# Patient Record
Sex: FEMALE | Race: BLACK OR AFRICAN AMERICAN | NOT HISPANIC OR LATINO | Employment: UNEMPLOYED | URBAN - METROPOLITAN AREA
[De-identification: names, ages, dates, MRNs, and addresses within clinical notes are randomized per-mention and may not be internally consistent; named-entity substitution may affect disease eponyms.]

---

## 2019-06-01 ENCOUNTER — OFFICE VISIT (OUTPATIENT)
Dept: URGENT CARE | Facility: CLINIC | Age: 54
End: 2019-06-01
Payer: MEDICAID

## 2019-06-01 VITALS
TEMPERATURE: 99 F | WEIGHT: 293 LBS | SYSTOLIC BLOOD PRESSURE: 134 MMHG | OXYGEN SATURATION: 98 % | DIASTOLIC BLOOD PRESSURE: 82 MMHG | HEART RATE: 107 BPM

## 2019-06-01 DIAGNOSIS — B96.89 ACUTE BRONCHITIS, BACTERIAL: Primary | ICD-10-CM

## 2019-06-01 DIAGNOSIS — J20.8 ACUTE BRONCHITIS, BACTERIAL: Primary | ICD-10-CM

## 2019-06-01 PROCEDURE — 99214 PR OFFICE/OUTPT VISIT, EST, LEVL IV, 30-39 MIN: ICD-10-PCS | Mod: 25,S$GLB,, | Performed by: NURSE PRACTITIONER

## 2019-06-01 PROCEDURE — 94640 AIRWAY INHALATION TREATMENT: CPT | Mod: S$GLB,,, | Performed by: NURSE PRACTITIONER

## 2019-06-01 PROCEDURE — 99214 OFFICE O/P EST MOD 30 MIN: CPT | Mod: 25,S$GLB,, | Performed by: NURSE PRACTITIONER

## 2019-06-01 PROCEDURE — 94640 PR INHAL RX, AIRWAY OBST/DX SPUTUM INDUCT: ICD-10-PCS | Mod: S$GLB,,, | Performed by: NURSE PRACTITIONER

## 2019-06-01 RX ORDER — PROMETHAZINE HYDROCHLORIDE AND CODEINE PHOSPHATE 6.25; 1 MG/5ML; MG/5ML
5 SOLUTION ORAL EVERY 4 HOURS PRN
Qty: 473 ML | Refills: 0 | Status: SHIPPED | OUTPATIENT
Start: 2019-06-01 | End: 2019-06-11

## 2019-06-01 RX ORDER — AMOXICILLIN AND CLAVULANATE POTASSIUM 875; 125 MG/1; MG/1
1 TABLET, FILM COATED ORAL 2 TIMES DAILY
Qty: 20 TABLET | Refills: 0 | Status: SHIPPED | OUTPATIENT
Start: 2019-06-01 | End: 2019-06-11

## 2019-06-01 RX ORDER — METHYLPREDNISOLONE 4 MG/1
TABLET ORAL
Qty: 1 PACKAGE | Refills: 0 | Status: SHIPPED | OUTPATIENT
Start: 2019-06-01

## 2019-06-01 RX ORDER — BETAMETHASONE SODIUM PHOSPHATE AND BETAMETHASONE ACETATE 3; 3 MG/ML; MG/ML
9 INJECTION, SUSPENSION INTRA-ARTICULAR; INTRALESIONAL; INTRAMUSCULAR; SOFT TISSUE
Status: COMPLETED | OUTPATIENT
Start: 2019-06-01 | End: 2019-06-01

## 2019-06-01 RX ORDER — ALBUTEROL SULFATE 90 UG/1
2 AEROSOL, METERED RESPIRATORY (INHALATION) EVERY 6 HOURS PRN
Qty: 18 G | Refills: 0 | Status: SHIPPED | OUTPATIENT
Start: 2019-06-01 | End: 2020-05-31

## 2019-06-01 RX ORDER — LEVALBUTEROL INHALATION SOLUTION 1.25 MG/3ML
1.25 SOLUTION RESPIRATORY (INHALATION)
Status: COMPLETED | OUTPATIENT
Start: 2019-06-01 | End: 2019-06-01

## 2019-06-01 RX ADMIN — LEVALBUTEROL INHALATION SOLUTION 1.25 MG: 1.25 SOLUTION RESPIRATORY (INHALATION) at 02:06

## 2019-06-01 RX ADMIN — BETAMETHASONE SODIUM PHOSPHATE AND BETAMETHASONE ACETATE 9 MG: 3; 3 INJECTION, SUSPENSION INTRA-ARTICULAR; INTRALESIONAL; INTRAMUSCULAR; SOFT TISSUE at 02:06

## 2019-06-01 NOTE — PATIENT INSTRUCTIONS
HOME CARE:  If symptoms are severe, rest at home for the first 2-3 days. When you resume activity, don't let yourself get too tired.  Do not smoke. Avoid being exposed to the smoke of others.  You may use acetaminophen (Tylenol) or ibuprofen (Motrin, Advil) to control fever or pain, unless another medicine was prescribed for this. [NOTE: If you have chronic liver or kidney disease or ever had a stomach ulcer or GI bleeding, talk with your doctor before using these medicines.]  Your appetite may be poor, so a light diet is fine. Avoid dehydration by drinking 6-8 glasses of fluids per day (water, soft, drinks, juices, tea, soup, etc.). Extra fluids will help loosen secretions in the lungs.  Over-the-counter cough medicines that contain dextromethorphan (such as Robitussin DM) and decongestants (Actifed or Sudafed) may help relieve cough and congestion. [NOTE: Do not use decongestants if you have high blood pressure.]  Finish all antibiotic medicine, even if you are feeling better after only a few days.  FOLLOW UP with your doctor or as directed if you dont start to feel better after three days.  [NOTE: If you are age 65 or older, or if you have chronic asthma or COPD, we recommend a PNEUMOCOCCAL VACCINATION every five years and a yearly INFLUENZAVACCINATION (FLU-SHOT) every autumn. Ask your doctor about this. If you had an X-ray, a radiologist will review it. You will be notified of any new findings that may affect your care.]  GET PROMPT MEDICAL ATTENTION if any of the following occur:  Fever over 100.4°F (38.0°C) for more than three days  Trouble breathing, wheezing or pain with breathing  Coughing up blood or increased amounts of colored sputum  Weakness, drowsiness, headache, facial pain, ear pain or a stiff neck  © 4901-6141 [facility], [address] [place] [address], [place] [address]. All rights reserved. This information is not intended as a substitute for professional medical care. Always follow your  healthcare professional's instructions.

## 2019-06-01 NOTE — PROGRESS NOTES
Subjective:       Patient ID: Criss Burnett is a 54 y.o. female.    Vitals:  temperature is 98.6 °F (37 °C). Her blood pressure is 134/82 and her pulse is 107. Her oxygen saturation is 98%.     Chief Complaint: Shortness of Breath    Pt states that she has uri symptoms along with shortness of breathe and right shoulder pain for about 2 weeks now     Shortness of Breath   This is a new problem. The current episode started 1 to 4 weeks ago. The problem occurs constantly. The problem has been gradually worsening. Associated symptoms include ear pain and a sore throat. Pertinent negatives include no fever, hemoptysis, rash, sputum production, vomiting or wheezing. Nothing aggravates the symptoms. She has tried OTC cough suppressants (night quil ) for the symptoms. The treatment provided no relief.       Constitution: Negative for chills, sweating, fatigue and fever.   HENT: Positive for ear pain, congestion and sore throat. Negative for sinus pain, sinus pressure and voice change.    Neck: Negative for painful lymph nodes.   Eyes: Negative for eye redness.   Respiratory: Positive for cough and shortness of breath. Negative for chest tightness, sputum production, bloody sputum, COPD, stridor, wheezing and asthma.    Gastrointestinal: Negative for nausea and vomiting.   Musculoskeletal: Negative for muscle ache.   Skin: Negative for rash.   Allergic/Immunologic: Negative for seasonal allergies and asthma.   Hematologic/Lymphatic: Negative for swollen lymph nodes.       Objective:      Physical Exam   Constitutional: She is oriented to person, place, and time. Vital signs are normal. She appears well-developed and well-nourished.   HENT:   Head: Normocephalic and atraumatic.   Right Ear: External ear normal.   Left Ear: External ear normal.   Nose: Nose normal.   Mouth/Throat: Oropharynx is clear and moist. No oropharyngeal exudate.   Cardiovascular: Normal rate, regular rhythm and normal heart sounds.   Pulmonary/Chest:  Effort normal. She has rhonchi.   Neurological: She is alert and oriented to person, place, and time.   Skin: Skin is warm, dry and intact. Capillary refill takes less than 2 seconds.   Psychiatric: She has a normal mood and affect.       Assessment:       1. Acute bronchitis, bacterial        Plan:         Criss was seen today for shortness of breath.    Diagnoses and all orders for this visit:    Acute bronchitis, bacterial  -     methylPREDNISolone (MEDROL DOSEPACK) 4 mg tablet; use as directed  -     levalbuterol nebulizer solution 1.25 mg  -     betamethasone acetate-betamethasone sodium phosphate injection 9 mg  -     amoxicillin-clavulanate 875-125mg (AUGMENTIN) 875-125 mg per tablet; Take 1 tablet by mouth 2 (two) times daily. for 10 days  -     promethazine-codeine 6.25-10 mg/5 ml (PHENERGAN WITH CODEINE) 6.25-10 mg/5 mL syrup; Take 5 mLs by mouth every 4 (four) hours as needed for Cough.  -     albuterol (VENTOLIN HFA) 90 mcg/actuation inhaler; Inhale 2 puffs into the lungs every 6 (six) hours as needed for Wheezing. Rescue    HOME CARE:  If symptoms are severe, rest at home for the first 2-3 days. When you resume activity, don't let yourself get too tired.  Do not smoke. Avoid being exposed to the smoke of others.  You may use acetaminophen (Tylenol) or ibuprofen (Motrin, Advil) to control fever or pain, unless another medicine was prescribed for this. [NOTE: If you have chronic liver or kidney disease or ever had a stomach ulcer or GI bleeding, talk with your doctor before using these medicines.]  Your appetite may be poor, so a light diet is fine. Avoid dehydration by drinking 6-8 glasses of fluids per day (water, soft, drinks, juices, tea, soup, etc.). Extra fluids will help loosen secretions in the lungs.  Over-the-counter cough medicines that contain dextromethorphan (such as Robitussin DM) and decongestants (Actifed or Sudafed) may help relieve cough and congestion. [NOTE: Do not use decongestants  if you have high blood pressure.]  Finish all antibiotic medicine, even if you are feeling better after only a few days.  FOLLOW UP with your doctor or as directed if you dont start to feel better after three days.  [NOTE: If you are age 65 or older, or if you have chronic asthma or COPD, we recommend a PNEUMOCOCCAL VACCINATION every five years and a yearly INFLUENZAVACCINATION (FLU-SHOT) every autumn. Ask your doctor about this. If you had an X-ray, a radiologist will review it. You will be notified of any new findings that may affect your care.]  GET PROMPT MEDICAL ATTENTION if any of the following occur:  Fever over 100.4°F (38.0°C) for more than three days  Trouble breathing, wheezing or pain with breathing  Coughing up blood or increased amounts of colored sputum  Weakness, drowsiness, headache, facial pain, ear pain or a stiff neck  © 4001-0937 [address], [place] [address]. All rights reserved. This information is not intended as a substitute for professional medical care. Always follow your healthcare professional's instructions.